# Patient Record
Sex: MALE | Race: WHITE | Employment: OTHER
[De-identification: names, ages, dates, MRNs, and addresses within clinical notes are randomized per-mention and may not be internally consistent; named-entity substitution may affect disease eponyms.]

---

## 2023-07-11 ENCOUNTER — OFFICE VISIT (OUTPATIENT)
Dept: PRIMARY CARE CLINIC | Facility: CLINIC | Age: 48
End: 2023-07-11

## 2023-07-11 VITALS
WEIGHT: 183.2 LBS | TEMPERATURE: 97.2 F | BODY MASS INDEX: 24.28 KG/M2 | OXYGEN SATURATION: 95 % | SYSTOLIC BLOOD PRESSURE: 123 MMHG | DIASTOLIC BLOOD PRESSURE: 90 MMHG | HEIGHT: 73 IN

## 2023-07-11 DIAGNOSIS — G89.11 ACUTE PAIN OF LEFT SHOULDER DUE TO TRAUMA: ICD-10-CM

## 2023-07-11 DIAGNOSIS — M25.512 ACUTE PAIN OF LEFT SHOULDER DUE TO TRAUMA: ICD-10-CM

## 2023-07-11 DIAGNOSIS — Z00.00 PHYSICAL EXAM, ROUTINE: ICD-10-CM

## 2023-07-11 DIAGNOSIS — J32.0 CHRONIC LEFT MAXILLARY SINUSITIS: Primary | ICD-10-CM

## 2023-07-11 PROCEDURE — 99203 OFFICE O/P NEW LOW 30 MIN: CPT | Performed by: FAMILY MEDICINE

## 2023-07-11 RX ORDER — METHYLPREDNISOLONE 4 MG/1
TABLET ORAL
Qty: 1 KIT | Refills: 0 | Status: SHIPPED | OUTPATIENT
Start: 2023-07-11 | End: 2023-07-17

## 2023-07-11 RX ORDER — AZITHROMYCIN 500 MG/1
500 TABLET, FILM COATED ORAL DAILY
Qty: 6 TABLET | Refills: 0 | Status: SHIPPED | OUTPATIENT
Start: 2023-07-11 | End: 2023-07-17

## 2023-07-11 SDOH — ECONOMIC STABILITY: HOUSING INSECURITY
IN THE LAST 12 MONTHS, WAS THERE A TIME WHEN YOU DID NOT HAVE A STEADY PLACE TO SLEEP OR SLEPT IN A SHELTER (INCLUDING NOW)?: NO

## 2023-07-11 SDOH — ECONOMIC STABILITY: FOOD INSECURITY: WITHIN THE PAST 12 MONTHS, YOU WORRIED THAT YOUR FOOD WOULD RUN OUT BEFORE YOU GOT MONEY TO BUY MORE.: NEVER TRUE

## 2023-07-11 SDOH — ECONOMIC STABILITY: FOOD INSECURITY: WITHIN THE PAST 12 MONTHS, THE FOOD YOU BOUGHT JUST DIDN'T LAST AND YOU DIDN'T HAVE MONEY TO GET MORE.: NEVER TRUE

## 2023-07-11 SDOH — ECONOMIC STABILITY: INCOME INSECURITY: HOW HARD IS IT FOR YOU TO PAY FOR THE VERY BASICS LIKE FOOD, HOUSING, MEDICAL CARE, AND HEATING?: NOT HARD AT ALL

## 2023-07-11 ASSESSMENT — PATIENT HEALTH QUESTIONNAIRE - PHQ9
SUM OF ALL RESPONSES TO PHQ9 QUESTIONS 1 & 2: 0
1. LITTLE INTEREST OR PLEASURE IN DOING THINGS: 0
2. FEELING DOWN, DEPRESSED OR HOPELESS: 0
SUM OF ALL RESPONSES TO PHQ QUESTIONS 1-9: 0

## 2023-07-11 NOTE — PROGRESS NOTES
normal.         Thought Content: Thought content normal.         Judgment: Judgment normal.      Comments: Acute and General health anxiety           LABS  No results found for this visit on 07/11/23. IMPRESSION/PLAN     Diagnosis Orders   1. Chronic left maxillary sinusitis  methylPREDNISolone (MEDROL DOSEPACK) 4 MG tablet    [address] [facility], [place]    azithromycin (ZITHROMAX) 500 MG tablet      2. Physical exam, routine        3. Acute pain of left shoulder due to trauma            No follow-up provider specified. Leeann Lucia MD            Dictated using voice recognition software.  Proofread, but unrecognized voice recognition errors may exist.

## 2023-07-12 PROBLEM — M25.512 ACUTE PAIN OF LEFT SHOULDER DUE TO TRAUMA: Status: ACTIVE | Noted: 2023-07-12

## 2023-07-12 PROBLEM — Z00.00 PHYSICAL EXAM, ROUTINE: Status: ACTIVE | Noted: 2023-07-12

## 2023-07-12 PROBLEM — G89.11 ACUTE PAIN OF LEFT SHOULDER DUE TO TRAUMA: Status: ACTIVE | Noted: 2023-07-12

## 2023-08-11 PROBLEM — Z00.00 PHYSICAL EXAM, ROUTINE: Status: RESOLVED | Noted: 2023-07-12 | Resolved: 2023-08-11
